# Patient Record
Sex: FEMALE | Race: BLACK OR AFRICAN AMERICAN | Employment: FULL TIME | URBAN - METROPOLITAN AREA
[De-identification: names, ages, dates, MRNs, and addresses within clinical notes are randomized per-mention and may not be internally consistent; named-entity substitution may affect disease eponyms.]

---

## 2021-06-01 PROBLEM — R06.00 DYSPNEA: Status: ACTIVE | Noted: 2018-05-08

## 2021-06-01 PROBLEM — R07.89 OTHER CHEST PAIN: Status: ACTIVE | Noted: 2018-05-08

## 2021-06-01 PROBLEM — J45.909 ASTHMA: Status: ACTIVE | Noted: 2018-05-09

## 2021-06-18 ENCOUNTER — HOSPITAL ENCOUNTER (OUTPATIENT)
Dept: ULTRASOUND IMAGING | Age: 61
Discharge: HOME | End: 2021-06-18
Attending: FAMILY MEDICINE
Payer: COMMERCIAL

## 2021-06-18 DIAGNOSIS — M79.604 PAIN IN BOTH LOWER EXTREMITIES: ICD-10-CM

## 2021-06-18 DIAGNOSIS — M79.605 PAIN IN BOTH LOWER EXTREMITIES: ICD-10-CM

## 2021-06-18 DIAGNOSIS — M79.89 LEG SWELLING: ICD-10-CM

## 2021-06-18 PROCEDURE — 93970 EXTREMITY STUDY: CPT

## 2021-10-20 LAB — MAMMOGRAPHY, EXTERNAL: NORMAL

## 2022-03-18 PROBLEM — J45.909 ASTHMA: Status: ACTIVE | Noted: 2018-05-09

## 2022-03-19 PROBLEM — R07.89 OTHER CHEST PAIN: Status: ACTIVE | Noted: 2018-05-08

## 2022-03-19 PROBLEM — R06.00 DYSPNEA: Status: ACTIVE | Noted: 2018-05-08

## 2022-07-29 ENCOUNTER — OFFICE VISIT (OUTPATIENT)
Dept: FAMILY MEDICINE CLINIC | Facility: CLINIC | Age: 62
End: 2022-07-29
Payer: COMMERCIAL

## 2022-07-29 VITALS
TEMPERATURE: 98.1 F | OXYGEN SATURATION: 99 % | BODY MASS INDEX: 33.06 KG/M2 | DIASTOLIC BLOOD PRESSURE: 80 MMHG | HEIGHT: 60 IN | WEIGHT: 168.4 LBS | SYSTOLIC BLOOD PRESSURE: 114 MMHG | HEART RATE: 70 BPM

## 2022-07-29 DIAGNOSIS — Z11.4 SCREENING FOR HIV (HUMAN IMMUNODEFICIENCY VIRUS): ICD-10-CM

## 2022-07-29 DIAGNOSIS — Z12.31 ENCOUNTER FOR SCREENING MAMMOGRAM FOR MALIGNANT NEOPLASM OF BREAST: ICD-10-CM

## 2022-07-29 DIAGNOSIS — E78.49 OTHER HYPERLIPIDEMIA: ICD-10-CM

## 2022-07-29 DIAGNOSIS — R73.09 ABNORMAL GLUCOSE: ICD-10-CM

## 2022-07-29 DIAGNOSIS — I10 PRIMARY HYPERTENSION: Primary | ICD-10-CM

## 2022-07-29 LAB
ALBUMIN SERPL-MCNC: 4.1 G/DL (ref 3.5–5)
ALBUMIN/GLOB SERPL: 1.1 {RATIO} (ref 1.2–3.5)
ALP SERPL-CCNC: 73 U/L (ref 50–136)
ALT SERPL-CCNC: 21 U/L (ref 12–65)
ANION GAP SERPL CALC-SCNC: 7 MMOL/L (ref 7–16)
AST SERPL-CCNC: 16 U/L (ref 15–37)
BASOPHILS # BLD: 0.1 K/UL (ref 0–0.2)
BASOPHILS NFR BLD: 1 % (ref 0–2)
BILIRUB SERPL-MCNC: 0.4 MG/DL (ref 0.2–1.1)
BUN SERPL-MCNC: 10 MG/DL (ref 6–23)
CALCIUM SERPL-MCNC: 9.6 MG/DL (ref 8.3–10.4)
CHLORIDE SERPL-SCNC: 105 MMOL/L (ref 98–107)
CHOLEST SERPL-MCNC: 240 MG/DL
CO2 SERPL-SCNC: 28 MMOL/L (ref 21–32)
CREAT SERPL-MCNC: 0.6 MG/DL (ref 0.6–1)
DIFFERENTIAL METHOD BLD: NORMAL
EOSINOPHIL # BLD: 0.2 K/UL (ref 0–0.8)
EOSINOPHIL NFR BLD: 2 % (ref 0.5–7.8)
ERYTHROCYTE [DISTWIDTH] IN BLOOD BY AUTOMATED COUNT: 14.5 % (ref 11.9–14.6)
EST. AVERAGE GLUCOSE BLD GHB EST-MCNC: 117 MG/DL
GLOBULIN SER CALC-MCNC: 3.6 G/DL (ref 2.3–3.5)
GLUCOSE SERPL-MCNC: 96 MG/DL (ref 65–100)
HBA1C MFR BLD: 5.7 % (ref 4.8–5.6)
HCT VFR BLD AUTO: 38.9 % (ref 35.8–46.3)
HDLC SERPL-MCNC: 43 MG/DL (ref 40–60)
HDLC SERPL: 5.6 {RATIO}
HGB BLD-MCNC: 13.1 G/DL (ref 11.7–15.4)
HIV 1+2 AB+HIV1 P24 AG SERPL QL IA: NONREACTIVE
HIV 1/2 RESULT COMMENT: NORMAL
IMM GRANULOCYTES # BLD AUTO: 0 K/UL (ref 0–0.5)
IMM GRANULOCYTES NFR BLD AUTO: 0 % (ref 0–5)
LDLC SERPL CALC-MCNC: 180.2 MG/DL
LYMPHOCYTES # BLD: 2.5 K/UL (ref 0.5–4.6)
LYMPHOCYTES NFR BLD: 35 % (ref 13–44)
MCH RBC QN AUTO: 29.5 PG (ref 26.1–32.9)
MCHC RBC AUTO-ENTMCNC: 33.7 G/DL (ref 31.4–35)
MCV RBC AUTO: 87.6 FL (ref 79.6–97.8)
MONOCYTES # BLD: 0.4 K/UL (ref 0.1–1.3)
MONOCYTES NFR BLD: 6 % (ref 4–12)
NEUTS SEG # BLD: 3.9 K/UL (ref 1.7–8.2)
NEUTS SEG NFR BLD: 56 % (ref 43–78)
NRBC # BLD: 0 K/UL (ref 0–0.2)
PLATELET # BLD AUTO: 304 K/UL (ref 150–450)
PMV BLD AUTO: 10.4 FL (ref 9.4–12.3)
POTASSIUM SERPL-SCNC: 3.7 MMOL/L (ref 3.5–5.1)
PROT SERPL-MCNC: 7.7 G/DL (ref 6.3–8.2)
RBC # BLD AUTO: 4.44 M/UL (ref 4.05–5.2)
SODIUM SERPL-SCNC: 140 MMOL/L (ref 136–145)
TRIGL SERPL-MCNC: 84 MG/DL (ref 35–150)
VLDLC SERPL CALC-MCNC: 16.8 MG/DL (ref 6–23)
WBC # BLD AUTO: 7 K/UL (ref 4.3–11.1)

## 2022-07-29 PROCEDURE — 99214 OFFICE O/P EST MOD 30 MIN: CPT | Performed by: FAMILY MEDICINE

## 2022-07-29 RX ORDER — POTASSIUM CHLORIDE 750 MG/1
10 TABLET, EXTENDED RELEASE ORAL 2 TIMES DAILY
Qty: 180 TABLET | Refills: 2 | Status: SHIPPED | OUTPATIENT
Start: 2022-07-29 | End: 2022-11-02 | Stop reason: DRUGHIGH

## 2022-07-29 RX ORDER — INDAPAMIDE 2.5 MG/1
2.5 TABLET, FILM COATED ORAL DAILY
Qty: 90 TABLET | Refills: 2 | Status: SHIPPED | OUTPATIENT
Start: 2022-07-29 | End: 2023-04-25

## 2022-07-29 ASSESSMENT — PATIENT HEALTH QUESTIONNAIRE - PHQ9
SUM OF ALL RESPONSES TO PHQ QUESTIONS 1-9: 0
1. LITTLE INTEREST OR PLEASURE IN DOING THINGS: 0
SUM OF ALL RESPONSES TO PHQ9 QUESTIONS 1 & 2: 0
SUM OF ALL RESPONSES TO PHQ QUESTIONS 1-9: 0
2. FEELING DOWN, DEPRESSED OR HOPELESS: 0

## 2022-07-30 ASSESSMENT — ENCOUNTER SYMPTOMS
BLOOD IN STOOL: 0
ABDOMINAL PAIN: 0
NAUSEA: 0
COUGH: 0
PHOTOPHOBIA: 0
ABDOMINAL DISTENTION: 0
BLURRED VISION: 0
SORE THROAT: 0
SHORTNESS OF BREATH: 0
EYE PAIN: 0
COLOR CHANGE: 0

## 2022-07-30 NOTE — PROGRESS NOTES
Werner Marcus  1960 is a 63 y.o. female ,Established patient, here for evaluation of the following chief complaint(s):   Chief Complaint   Patient presents with    3 Month Follow-Up    Medication Refill    Cholesterol Problem     Pt is fasting          1. Primary hypertension  -     potassium chloride (KLOR-CON M) 10 MEQ extended release tablet; Take 1 tablet by mouth in the morning and 1 tablet before bedtime. , Disp-180 tablet, R-2Normal  -     indapamide (LOZOL) 2.5 MG tablet; Take 1 tablet by mouth in the morning., Disp-90 tablet, R-2Normal  -     CBC with Auto Differential; Future  -     Comprehensive Metabolic Panel; Future  2. Encounter for screening mammogram for malignant neoplasm of breast  -     KWAN DIGITAL SCREENING AUGMENTED BILATERAL; Future  3. Abnormal glucose  -     Comprehensive Metabolic Panel; Future  -     Hemoglobin A1C; Future  4. Other hyperlipidemia  -     Lipid Panel; Future  5. Screening for HIV (human immunodeficiency virus)  -     HIV 1/2 Ag/Ab, 4TH Generation,W Rflx Confirm; Future        Return in about 3 months (around 10/29/2022). Subjective   SUBJECTIVE/OBJECTIVE:  Hyperlipidemia  This is a chronic problem. The current episode started more than 1 year ago. The problem is uncontrolled. Recent lipid tests were reviewed and are high. She has no history of chronic renal disease or hypothyroidism. Pertinent negatives include no chest pain, myalgias or shortness of breath. There are no compliance problems. Hypertension  This is a chronic problem. The current episode started more than 1 year ago. The problem has been gradually improving since onset. The problem is controlled. Pertinent negatives include no anxiety, blurred vision, chest pain, headaches, palpitations, peripheral edema, PND, shortness of breath or sweats. There is no history of chronic renal disease. Review of Systems   Constitutional:  Negative for chills and fever.    HENT:  Negative for ear pain, hearing loss and sore throat. Eyes:  Negative for blurred vision, photophobia and pain. Respiratory:  Negative for cough and shortness of breath. Cardiovascular:  Negative for chest pain, palpitations, leg swelling and PND. Gastrointestinal:  Negative for abdominal distention, abdominal pain, blood in stool and nausea. Genitourinary:  Negative for dysuria and urgency. Musculoskeletal:  Negative for joint swelling and myalgias. Skin:  Negative for color change, pallor and rash. Neurological:  Negative for speech difficulty, weakness, light-headedness and headaches. Hematological:  Negative for adenopathy. Psychiatric/Behavioral:  Negative for agitation, confusion, hallucinations, self-injury and suicidal ideas. Objective   Physical Exam  Constitutional:       Appearance: Normal appearance. HENT:      Head: Normocephalic and atraumatic. Nose: Nose normal.   Eyes:      Extraocular Movements: Extraocular movements intact. Conjunctiva/sclera: Conjunctivae normal.      Pupils: Pupils are equal, round, and reactive to light. Cardiovascular:      Rate and Rhythm: Normal rate and regular rhythm. Pulses: Normal pulses. Heart sounds: Normal heart sounds. Pulmonary:      Effort: Pulmonary effort is normal.      Breath sounds: Normal breath sounds. Abdominal:      General: Abdomen is flat. Bowel sounds are normal.      Palpations: Abdomen is soft. Skin:     General: Skin is warm and dry. Neurological:      General: No focal deficit present. Mental Status: She is alert and oriented to person, place, and time. Psychiatric:         Mood and Affect: Mood normal.                 An electronic signature was used to authenticate this note.     --Ibeth Mann MD

## 2022-08-02 DIAGNOSIS — E78.49 OTHER HYPERLIPIDEMIA: Primary | ICD-10-CM

## 2022-08-02 RX ORDER — PRAVASTATIN SODIUM 20 MG
20 TABLET ORAL DAILY
Qty: 30 TABLET | Refills: 5 | Status: SHIPPED | OUTPATIENT
Start: 2022-08-02 | End: 2022-11-01

## 2022-10-18 LAB — MAMMOGRAPHY, EXTERNAL: NORMAL

## 2022-11-01 ENCOUNTER — OFFICE VISIT (OUTPATIENT)
Dept: FAMILY MEDICINE CLINIC | Facility: CLINIC | Age: 62
End: 2022-11-01
Payer: COMMERCIAL

## 2022-11-01 VITALS
DIASTOLIC BLOOD PRESSURE: 78 MMHG | TEMPERATURE: 98 F | SYSTOLIC BLOOD PRESSURE: 136 MMHG | WEIGHT: 153 LBS | OXYGEN SATURATION: 98 % | HEART RATE: 82 BPM | HEIGHT: 60 IN | BODY MASS INDEX: 30.04 KG/M2

## 2022-11-01 DIAGNOSIS — Z78.9 STATIN INTOLERANCE: ICD-10-CM

## 2022-11-01 DIAGNOSIS — E78.2 MIXED HYPERLIPIDEMIA: ICD-10-CM

## 2022-11-01 DIAGNOSIS — N60.19 FIBROCYSTIC BREAST DISEASE (FCBD), UNSPECIFIED LATERALITY: ICD-10-CM

## 2022-11-01 DIAGNOSIS — R73.03 PREDIABETES: Primary | ICD-10-CM

## 2022-11-01 LAB
ALBUMIN SERPL-MCNC: 4 G/DL (ref 3.2–4.6)
ALBUMIN/GLOB SERPL: 1.2 {RATIO} (ref 0.4–1.6)
ALP SERPL-CCNC: 80 U/L (ref 50–136)
ALT SERPL-CCNC: 26 U/L (ref 12–65)
ANION GAP SERPL CALC-SCNC: 3 MMOL/L (ref 2–11)
AST SERPL-CCNC: 16 U/L (ref 15–37)
BILIRUB SERPL-MCNC: 0.4 MG/DL (ref 0.2–1.1)
BUN SERPL-MCNC: 17 MG/DL (ref 8–23)
CALCIUM SERPL-MCNC: 10.3 MG/DL (ref 8.3–10.4)
CHLORIDE SERPL-SCNC: 104 MMOL/L (ref 101–110)
CHOLEST SERPL-MCNC: 170 MG/DL
CO2 SERPL-SCNC: 32 MMOL/L (ref 21–32)
CREAT SERPL-MCNC: 0.6 MG/DL (ref 0.6–1)
GLOBULIN SER CALC-MCNC: 3.4 G/DL (ref 2.8–4.5)
GLUCOSE SERPL-MCNC: 102 MG/DL (ref 65–100)
HDLC SERPL-MCNC: 32 MG/DL (ref 40–60)
HDLC SERPL: 5.3 {RATIO}
LDLC SERPL CALC-MCNC: 117.4 MG/DL
POTASSIUM SERPL-SCNC: 3.2 MMOL/L (ref 3.5–5.1)
PROT SERPL-MCNC: 7.4 G/DL (ref 6.3–8.2)
SODIUM SERPL-SCNC: 139 MMOL/L (ref 133–143)
TRIGL SERPL-MCNC: 103 MG/DL (ref 35–150)
VLDLC SERPL CALC-MCNC: 20.6 MG/DL (ref 6–23)

## 2022-11-01 PROCEDURE — 99213 OFFICE O/P EST LOW 20 MIN: CPT | Performed by: FAMILY MEDICINE

## 2022-11-01 ASSESSMENT — PATIENT HEALTH QUESTIONNAIRE - PHQ9
1. LITTLE INTEREST OR PLEASURE IN DOING THINGS: 0
SUM OF ALL RESPONSES TO PHQ9 QUESTIONS 1 & 2: 0
2. FEELING DOWN, DEPRESSED OR HOPELESS: 0
SUM OF ALL RESPONSES TO PHQ QUESTIONS 1-9: 0

## 2022-11-02 DIAGNOSIS — E87.6 HYPOKALEMIA: Primary | ICD-10-CM

## 2022-11-02 LAB
EST. AVERAGE GLUCOSE BLD GHB EST-MCNC: 128 MG/DL
HBA1C MFR BLD: 6.1 % (ref 4.8–5.6)

## 2022-11-02 RX ORDER — POTASSIUM CHLORIDE 750 MG/1
10 TABLET, EXTENDED RELEASE ORAL 3 TIMES DAILY
Qty: 90 TABLET | Refills: 1 | Status: SHIPPED | OUTPATIENT
Start: 2022-11-02

## 2022-11-02 ASSESSMENT — ENCOUNTER SYMPTOMS
SHORTNESS OF BREATH: 0
PHOTOPHOBIA: 0
COUGH: 0
ABDOMINAL DISTENTION: 0
NAUSEA: 0
BLOOD IN STOOL: 0
EYE PAIN: 0
ABDOMINAL PAIN: 0
SORE THROAT: 0
COLOR CHANGE: 0

## 2022-11-02 NOTE — PROGRESS NOTES
Juan Chisholm  1960 is a 63 y.o. female ,Established patient, here for evaluation of the following chief complaint(s):   Chief Complaint   Patient presents with    Follow-up     STOPPED STATIN DUE TO SIDE EFFECTS           1. Prediabetes  -     Comprehensive Metabolic Panel; Future  -     Hemoglobin A1C; Future  2. Mixed hyperlipidemia  -     Lipid Panel; Future  3. Statin intolerance--CANNOT TAKE STATINS ---WILL CHECK CHOL. 4. Fibrocystic breast disease (FCBD), unspecified laterality--SHE WILL HAVE FOLLOW UP IMAGING IN April 2023--per radkiology recommendation. Return in about 3 months (around 2/1/2023). Subjective   SUBJECTIVE/OBJECTIVE:  Hyperlipidemia  This is a chronic (SHE COULD NOT TOLERATE CHOL MED) problem. The current episode started more than 1 year ago. The problem is resistant. Recent lipid tests were reviewed and are high. She has no history of chronic renal disease. PREDIABETES. Pertinent negatives include no chest pain, myalgias or shortness of breath. Review of Systems   Constitutional:  Negative for chills and fever. HENT:  Negative for ear pain, hearing loss and sore throat. Eyes:  Negative for photophobia and pain. Respiratory:  Negative for cough and shortness of breath. Cardiovascular:  Negative for chest pain, palpitations and leg swelling. Gastrointestinal:  Negative for abdominal distention, abdominal pain, blood in stool and nausea. Genitourinary:  Negative for dysuria and urgency. Musculoskeletal:  Negative for joint swelling and myalgias. Skin:  Negative for color change, pallor and rash. Neurological:  Negative for speech difficulty, weakness, light-headedness and headaches. Hematological:  Negative for adenopathy. Psychiatric/Behavioral:  Negative for agitation, confusion, hallucinations, self-injury and suicidal ideas. Objective   Physical Exam  Constitutional:       Appearance: Normal appearance.    HENT:      Head: Normocephalic and atraumatic. Nose: Nose normal.   Eyes:      Extraocular Movements: Extraocular movements intact. Conjunctiva/sclera: Conjunctivae normal.      Pupils: Pupils are equal, round, and reactive to light. Cardiovascular:      Rate and Rhythm: Normal rate and regular rhythm. Pulses: Normal pulses. Heart sounds: Normal heart sounds. Pulmonary:      Effort: Pulmonary effort is normal.      Breath sounds: Normal breath sounds. Abdominal:      General: Abdomen is flat. Bowel sounds are normal.      Palpations: Abdomen is soft. Skin:     General: Skin is warm and dry. Neurological:      General: No focal deficit present. Mental Status: She is alert and oriented to person, place, and time. Psychiatric:         Mood and Affect: Mood normal.                 An electronic signature was used to authenticate this note.     --Bladimir Valderrama MD

## 2022-11-25 ENCOUNTER — TELEPHONE (OUTPATIENT)
Dept: FAMILY MEDICINE CLINIC | Facility: CLINIC | Age: 62
End: 2022-11-25

## 2022-11-25 DIAGNOSIS — E87.6 HYPOKALEMIA: Primary | ICD-10-CM

## 2022-11-30 ENCOUNTER — NURSE ONLY (OUTPATIENT)
Dept: FAMILY MEDICINE CLINIC | Facility: CLINIC | Age: 62
End: 2022-11-30

## 2022-11-30 DIAGNOSIS — E87.6 HYPOKALEMIA: ICD-10-CM

## 2022-11-30 LAB — POTASSIUM SERPL-SCNC: 4.1 MMOL/L (ref 3.5–5.1)

## 2023-03-28 DIAGNOSIS — E87.6 HYPOKALEMIA: ICD-10-CM

## 2023-03-28 RX ORDER — POTASSIUM CHLORIDE 750 MG/1
10 TABLET, EXTENDED RELEASE ORAL 3 TIMES DAILY
Qty: 90 TABLET | Refills: 1 | Status: SHIPPED | OUTPATIENT
Start: 2023-03-28

## 2023-04-18 ENCOUNTER — OFFICE VISIT (OUTPATIENT)
Dept: FAMILY MEDICINE CLINIC | Facility: CLINIC | Age: 63
End: 2023-04-18
Payer: COMMERCIAL

## 2023-04-18 VITALS
BODY MASS INDEX: 33.57 KG/M2 | HEART RATE: 83 BPM | TEMPERATURE: 98.3 F | HEIGHT: 60 IN | WEIGHT: 171 LBS | OXYGEN SATURATION: 98 % | DIASTOLIC BLOOD PRESSURE: 86 MMHG | SYSTOLIC BLOOD PRESSURE: 118 MMHG

## 2023-04-18 DIAGNOSIS — R73.03 PREDIABETES: ICD-10-CM

## 2023-04-18 DIAGNOSIS — E87.6 HYPOKALEMIA: ICD-10-CM

## 2023-04-18 DIAGNOSIS — J45.21 MILD INTERMITTENT REACTIVE AIRWAY DISEASE WITH ACUTE EXACERBATION: Primary | ICD-10-CM

## 2023-04-18 DIAGNOSIS — I10 PRIMARY HYPERTENSION: ICD-10-CM

## 2023-04-18 DIAGNOSIS — E78.2 MIXED HYPERLIPIDEMIA: ICD-10-CM

## 2023-04-18 LAB
BASOPHILS # BLD: 0.1 K/UL (ref 0–0.2)
BASOPHILS NFR BLD: 1 % (ref 0–2)
DIFFERENTIAL METHOD BLD: NORMAL
EOSINOPHIL # BLD: 0.4 K/UL (ref 0–0.8)
EOSINOPHIL NFR BLD: 4 % (ref 0.5–7.8)
ERYTHROCYTE [DISTWIDTH] IN BLOOD BY AUTOMATED COUNT: 14.6 % (ref 11.9–14.6)
HCT VFR BLD AUTO: 39 % (ref 35.8–46.3)
HGB BLD-MCNC: 12.9 G/DL (ref 11.7–15.4)
IMM GRANULOCYTES # BLD AUTO: 0 K/UL (ref 0–0.5)
IMM GRANULOCYTES NFR BLD AUTO: 0 % (ref 0–5)
LYMPHOCYTES # BLD: 3.8 K/UL (ref 0.5–4.6)
LYMPHOCYTES NFR BLD: 40 % (ref 13–44)
MCH RBC QN AUTO: 29.6 PG (ref 26.1–32.9)
MCHC RBC AUTO-ENTMCNC: 33.1 G/DL (ref 31.4–35)
MCV RBC AUTO: 89.4 FL (ref 82–102)
MONOCYTES # BLD: 0.6 K/UL (ref 0.1–1.3)
MONOCYTES NFR BLD: 6 % (ref 4–12)
NEUTS SEG # BLD: 4.6 K/UL (ref 1.7–8.2)
NEUTS SEG NFR BLD: 49 % (ref 43–78)
NRBC # BLD: 0 K/UL (ref 0–0.2)
PLATELET # BLD AUTO: 321 K/UL (ref 150–450)
PMV BLD AUTO: 10.6 FL (ref 9.4–12.3)
RBC # BLD AUTO: 4.36 M/UL (ref 4.05–5.2)
WBC # BLD AUTO: 9.4 K/UL (ref 4.3–11.1)

## 2023-04-18 PROCEDURE — 99214 OFFICE O/P EST MOD 30 MIN: CPT | Performed by: FAMILY MEDICINE

## 2023-04-18 PROCEDURE — 3074F SYST BP LT 130 MM HG: CPT | Performed by: FAMILY MEDICINE

## 2023-04-18 PROCEDURE — 3079F DIAST BP 80-89 MM HG: CPT | Performed by: FAMILY MEDICINE

## 2023-04-18 RX ORDER — POTASSIUM CHLORIDE 750 MG/1
10 TABLET, EXTENDED RELEASE ORAL 3 TIMES DAILY
Qty: 90 TABLET | Refills: 2 | Status: SHIPPED | OUTPATIENT
Start: 2023-04-18

## 2023-04-18 RX ORDER — INDAPAMIDE 2.5 MG/1
2.5 TABLET, FILM COATED ORAL DAILY
Qty: 90 TABLET | Refills: 2 | Status: SHIPPED | OUTPATIENT
Start: 2023-04-18 | End: 2024-01-13

## 2023-04-18 RX ORDER — ALBUTEROL SULFATE 90 UG/1
2 AEROSOL, METERED RESPIRATORY (INHALATION) EVERY 6 HOURS PRN
Qty: 18 G | Refills: 5 | Status: SHIPPED | OUTPATIENT
Start: 2023-04-18

## 2023-04-18 SDOH — ECONOMIC STABILITY: FOOD INSECURITY: WITHIN THE PAST 12 MONTHS, THE FOOD YOU BOUGHT JUST DIDN'T LAST AND YOU DIDN'T HAVE MONEY TO GET MORE.: SOMETIMES TRUE

## 2023-04-18 SDOH — ECONOMIC STABILITY: HOUSING INSECURITY
IN THE LAST 12 MONTHS, WAS THERE A TIME WHEN YOU DID NOT HAVE A STEADY PLACE TO SLEEP OR SLEPT IN A SHELTER (INCLUDING NOW)?: NO

## 2023-04-18 SDOH — ECONOMIC STABILITY: INCOME INSECURITY: HOW HARD IS IT FOR YOU TO PAY FOR THE VERY BASICS LIKE FOOD, HOUSING, MEDICAL CARE, AND HEATING?: PATIENT DECLINED

## 2023-04-18 SDOH — ECONOMIC STABILITY: FOOD INSECURITY: WITHIN THE PAST 12 MONTHS, YOU WORRIED THAT YOUR FOOD WOULD RUN OUT BEFORE YOU GOT MONEY TO BUY MORE.: SOMETIMES TRUE

## 2023-04-18 ASSESSMENT — ENCOUNTER SYMPTOMS
PHOTOPHOBIA: 0
ABDOMINAL DISTENTION: 0
SHORTNESS OF BREATH: 0
COLOR CHANGE: 0
BLURRED VISION: 0
EYE PAIN: 0
NAUSEA: 0
CHANGE IN BOWEL HABIT: 0
COUGH: 0
ABDOMINAL PAIN: 0
BLOOD IN STOOL: 0
SORE THROAT: 0

## 2023-04-18 ASSESSMENT — PATIENT HEALTH QUESTIONNAIRE - PHQ9
SUM OF ALL RESPONSES TO PHQ QUESTIONS 1-9: 0
1. LITTLE INTEREST OR PLEASURE IN DOING THINGS: 0
SUM OF ALL RESPONSES TO PHQ QUESTIONS 1-9: 0
SUM OF ALL RESPONSES TO PHQ9 QUESTIONS 1 & 2: 0
2. FEELING DOWN, DEPRESSED OR HOPELESS: 0

## 2023-04-18 NOTE — PATIENT INSTRUCTIONS
card and are ALWAYS $5 with SNAP/EBT   Boxes are distributed every other week and you must preorder your box through their website  Drive-thru box pickup is every other Wednesday from 11 am-6 pm at: [address] (Nicholas Gann) [facility], [address] [facility]  Website:  [email or web address] Medical [email or web address] - Columbus SouthiFulfillment. [employer]/fsg     Food Pantries:   [address]   Phone: [phone]  Located in [facility], [address].  Open Thursdays 8a-12p  Website: wwwWalkHub [employer]: [phone]  Located at [address] [place] [address]., 8am-12pm Fridays  Website: [email or web address]. [email or web address]   [address] Pantry  Phone: [phone]  Located at [address].  Call for Pantry hours and availability  Website: PlayFitness.ee  Water of [address] [place] [address] Pantry  Phone: [phone]  Located at [phone]  Call for Pantry hours and availability    Website: Volt.pl. php  [address]  Phone: [phone]  Located at [facility]. Emergency Food Pantry Hours: Mon, Wed, and Fri 9am-1pm  Website: [email or web address].[employer]/  [address] Pantry  Phone: [phone]  Located at [address] [facility]. Call for hours and availability   Website: https://viVood/  [address] Pantry  Phone: [phone]  Located at [address]. Call for hours and availability; serves up to 76 families a week, based on donations  Website: [email or web address]      Need additional resources? Call 211 or Find Help: https://www. findhelp.org/

## 2023-04-18 NOTE — PROGRESS NOTES
Yennifer Rivera  1960 is a 63 y.o. female ,Established patient, here for evaluation of the following chief complaint(s):   Chief Complaint   Patient presents with    Follow-up     Pt is not fasting-    Medication Refill          1. Mild intermittent reactive airway disease with acute exacerbation  -     albuterol sulfate HFA (PROVENTIL;VENTOLIN;PROAIR) 108 (90 Base) MCG/ACT inhaler; Inhale 2 puffs into the lungs every 6 hours as needed for Wheezing, Disp-18 g, R-5Normal  2. Primary hypertension  -     indapamide (LOZOL) 2.5 MG tablet; Take 1 tablet by mouth daily, Disp-90 tablet, R-2Normal  -     CBC with Auto Differential; Future  -     Comprehensive Metabolic Panel; Future  3. Hypokalemia  -     potassium chloride (KLOR-CON M) 10 MEQ extended release tablet; Take 1 tablet by mouth 3 times daily, Disp-90 tablet, R-2Normal  4. Mixed hyperlipidemia  -     Lipid Panel; Future  5. Prediabetes  -     Hemoglobin A1C; Future        Return in about 6 months (around 10/18/2023). Subjective   SUBJECTIVE/OBJECTIVE:  Medication Refill  This is a chronic problem. The current episode started more than 1 year ago. The problem occurs daily. The problem has been unchanged. Pertinent negatives include no abdominal pain, anorexia, arthralgias, change in bowel habit, chest pain, chills, coughing, fever, headaches, joint swelling, myalgias, nausea, rash, sore throat or weakness. Hypertension  This is a chronic problem. The current episode started more than 1 year ago. The problem has been gradually improving since onset. Pertinent negatives include no anxiety, blurred vision, chest pain, headaches, malaise/fatigue, palpitations, peripheral edema, PND, shortness of breath or sweats. Review of Systems   Constitutional:  Negative for chills, fever and malaise/fatigue. HENT:  Negative for ear pain, hearing loss and sore throat. Eyes:  Negative for blurred vision, photophobia and pain.    Respiratory:  Negative for

## 2023-04-19 LAB
ALBUMIN SERPL-MCNC: 3.9 G/DL (ref 3.2–4.6)
ALBUMIN/GLOB SERPL: 1 (ref 0.4–1.6)
ALP SERPL-CCNC: 75 U/L (ref 50–136)
ALT SERPL-CCNC: 22 U/L (ref 12–65)
ANION GAP SERPL CALC-SCNC: 5 MMOL/L (ref 2–11)
AST SERPL-CCNC: 18 U/L (ref 15–37)
BILIRUB SERPL-MCNC: 0.2 MG/DL (ref 0.2–1.1)
BUN SERPL-MCNC: 15 MG/DL (ref 8–23)
CALCIUM SERPL-MCNC: 9.6 MG/DL (ref 8.3–10.4)
CHLORIDE SERPL-SCNC: 102 MMOL/L (ref 101–110)
CHOLEST SERPL-MCNC: 194 MG/DL
CO2 SERPL-SCNC: 30 MMOL/L (ref 21–32)
CREAT SERPL-MCNC: 0.6 MG/DL (ref 0.6–1)
EST. AVERAGE GLUCOSE BLD GHB EST-MCNC: 120 MG/DL
GLOBULIN SER CALC-MCNC: 3.9 G/DL (ref 2.8–4.5)
GLUCOSE SERPL-MCNC: 97 MG/DL (ref 65–100)
HBA1C MFR BLD: 5.8 % (ref 4.8–5.6)
HDLC SERPL-MCNC: 40 MG/DL (ref 40–60)
HDLC SERPL: 4.9
LDLC SERPL CALC-MCNC: 128.6 MG/DL
POTASSIUM SERPL-SCNC: 3.4 MMOL/L (ref 3.5–5.1)
PROT SERPL-MCNC: 7.8 G/DL (ref 6.3–8.2)
SODIUM SERPL-SCNC: 137 MMOL/L (ref 133–143)
TRIGL SERPL-MCNC: 127 MG/DL (ref 35–150)
VLDLC SERPL CALC-MCNC: 25.4 MG/DL (ref 6–23)

## 2023-09-20 ENCOUNTER — TELEPHONE (OUTPATIENT)
Dept: FAMILY MEDICINE CLINIC | Facility: CLINIC | Age: 63
End: 2023-09-20

## 2023-09-20 DIAGNOSIS — E87.6 HYPOKALEMIA: ICD-10-CM

## 2023-09-20 RX ORDER — POTASSIUM CHLORIDE 750 MG/1
10 TABLET, EXTENDED RELEASE ORAL 3 TIMES DAILY
Qty: 90 TABLET | Refills: 2 | Status: SHIPPED | OUTPATIENT
Start: 2023-09-20

## 2023-12-05 ENCOUNTER — OFFICE VISIT (OUTPATIENT)
Dept: FAMILY MEDICINE CLINIC | Facility: CLINIC | Age: 63
End: 2023-12-05
Payer: COMMERCIAL

## 2023-12-05 VITALS
WEIGHT: 170.4 LBS | OXYGEN SATURATION: 99 % | BODY MASS INDEX: 33.28 KG/M2 | SYSTOLIC BLOOD PRESSURE: 120 MMHG | HEART RATE: 76 BPM | DIASTOLIC BLOOD PRESSURE: 78 MMHG | TEMPERATURE: 98.3 F

## 2023-12-05 DIAGNOSIS — J45.21 MILD INTERMITTENT REACTIVE AIRWAY DISEASE WITH ACUTE EXACERBATION: ICD-10-CM

## 2023-12-05 DIAGNOSIS — R73.03 PREDIABETES: Primary | ICD-10-CM

## 2023-12-05 DIAGNOSIS — E78.2 MIXED HYPERLIPIDEMIA: ICD-10-CM

## 2023-12-05 DIAGNOSIS — E87.6 HYPOKALEMIA: ICD-10-CM

## 2023-12-05 DIAGNOSIS — I10 PRIMARY HYPERTENSION: ICD-10-CM

## 2023-12-05 LAB
ALBUMIN SERPL-MCNC: 3.9 G/DL (ref 3.2–4.6)
ALBUMIN/GLOB SERPL: 1 (ref 0.4–1.6)
ALP SERPL-CCNC: 68 U/L (ref 50–136)
ALT SERPL-CCNC: 24 U/L (ref 12–65)
ANION GAP SERPL CALC-SCNC: 8 MMOL/L (ref 2–11)
AST SERPL-CCNC: 16 U/L (ref 15–37)
BASOPHILS # BLD: 0.1 K/UL (ref 0–0.2)
BASOPHILS NFR BLD: 1 % (ref 0–2)
BILIRUB SERPL-MCNC: 0.4 MG/DL (ref 0.2–1.1)
BUN SERPL-MCNC: 14 MG/DL (ref 8–23)
CALCIUM SERPL-MCNC: 9.6 MG/DL (ref 8.3–10.4)
CHLORIDE SERPL-SCNC: 102 MMOL/L (ref 103–113)
CHOLEST SERPL-MCNC: 228 MG/DL
CO2 SERPL-SCNC: 27 MMOL/L (ref 21–32)
CREAT SERPL-MCNC: 0.6 MG/DL (ref 0.6–1)
DIFFERENTIAL METHOD BLD: NORMAL
EOSINOPHIL # BLD: 0.3 K/UL (ref 0–0.8)
EOSINOPHIL NFR BLD: 4 % (ref 0.5–7.8)
ERYTHROCYTE [DISTWIDTH] IN BLOOD BY AUTOMATED COUNT: 14.6 % (ref 11.9–14.6)
EST. AVERAGE GLUCOSE BLD GHB EST-MCNC: 117 MG/DL
GLOBULIN SER CALC-MCNC: 4 G/DL (ref 2.8–4.5)
GLUCOSE SERPL-MCNC: 101 MG/DL (ref 65–100)
HBA1C MFR BLD: 5.7 % (ref 4.8–5.6)
HCT VFR BLD AUTO: 37.2 % (ref 35.8–46.3)
HDLC SERPL-MCNC: 41 MG/DL (ref 40–60)
HDLC SERPL: 5.6
HGB BLD-MCNC: 12.5 G/DL (ref 11.7–15.4)
IMM GRANULOCYTES # BLD AUTO: 0 K/UL (ref 0–0.5)
IMM GRANULOCYTES NFR BLD AUTO: 0 % (ref 0–5)
LDLC SERPL CALC-MCNC: 165 MG/DL
LYMPHOCYTES # BLD: 3.3 K/UL (ref 0.5–4.6)
LYMPHOCYTES NFR BLD: 38 % (ref 13–44)
MCH RBC QN AUTO: 29.4 PG (ref 26.1–32.9)
MCHC RBC AUTO-ENTMCNC: 33.6 G/DL (ref 31.4–35)
MCV RBC AUTO: 87.5 FL (ref 82–102)
MONOCYTES # BLD: 0.5 K/UL (ref 0.1–1.3)
MONOCYTES NFR BLD: 5 % (ref 4–12)
NEUTS SEG # BLD: 4.4 K/UL (ref 1.7–8.2)
NEUTS SEG NFR BLD: 52 % (ref 43–78)
NRBC # BLD: 0 K/UL (ref 0–0.2)
PLATELET # BLD AUTO: 307 K/UL (ref 150–450)
PMV BLD AUTO: 10.1 FL (ref 9.4–12.3)
POTASSIUM SERPL-SCNC: 3.1 MMOL/L (ref 3.5–5.1)
PROT SERPL-MCNC: 7.9 G/DL (ref 6.3–8.2)
RBC # BLD AUTO: 4.25 M/UL (ref 4.05–5.2)
SODIUM SERPL-SCNC: 137 MMOL/L (ref 136–146)
TRIGL SERPL-MCNC: 110 MG/DL (ref 35–150)
VLDLC SERPL CALC-MCNC: 22 MG/DL (ref 6–23)
WBC # BLD AUTO: 8.5 K/UL (ref 4.3–11.1)

## 2023-12-05 PROCEDURE — 3078F DIAST BP <80 MM HG: CPT | Performed by: FAMILY MEDICINE

## 2023-12-05 PROCEDURE — 99214 OFFICE O/P EST MOD 30 MIN: CPT | Performed by: FAMILY MEDICINE

## 2023-12-05 PROCEDURE — 3074F SYST BP LT 130 MM HG: CPT | Performed by: FAMILY MEDICINE

## 2023-12-05 RX ORDER — INDAPAMIDE 2.5 MG/1
2.5 TABLET ORAL DAILY
Qty: 90 TABLET | Refills: 2 | Status: SHIPPED | OUTPATIENT
Start: 2023-12-05 | End: 2024-08-31

## 2023-12-05 RX ORDER — POTASSIUM CHLORIDE 750 MG/1
10 TABLET, EXTENDED RELEASE ORAL 3 TIMES DAILY
Qty: 90 TABLET | Refills: 2 | Status: SHIPPED | OUTPATIENT
Start: 2023-12-05 | End: 2023-12-06 | Stop reason: DRUGHIGH

## 2023-12-05 RX ORDER — ALBUTEROL SULFATE 90 UG/1
2 AEROSOL, METERED RESPIRATORY (INHALATION) EVERY 6 HOURS PRN
Qty: 18 G | Refills: 5 | Status: SHIPPED | OUTPATIENT
Start: 2023-12-05

## 2023-12-06 DIAGNOSIS — E87.6 HYPOKALEMIA: Primary | ICD-10-CM

## 2023-12-06 RX ORDER — POTASSIUM CHLORIDE 20 MEQ/1
20 TABLET, EXTENDED RELEASE ORAL 2 TIMES DAILY
Qty: 180 TABLET | Refills: 1 | Status: SHIPPED | OUTPATIENT
Start: 2023-12-06

## 2023-12-07 ASSESSMENT — ENCOUNTER SYMPTOMS
DIFFICULTY BREATHING: 0
SHORTNESS OF BREATH: 0
HEMOPTYSIS: 0
ABDOMINAL PAIN: 0
BLURRED VISION: 0
NAUSEA: 0
COLOR CHANGE: 0
CHEST TIGHTNESS: 0
COUGH: 0
FREQUENT THROAT CLEARING: 0
ABDOMINAL DISTENTION: 0
EYE PAIN: 0
SORE THROAT: 0
PHOTOPHOBIA: 0
BLOOD IN STOOL: 0

## 2023-12-08 NOTE — PROGRESS NOTES
shortness of breath or sweats. Review of Systems   Constitutional:  Negative for chills and fever. HENT:  Negative for ear pain, hearing loss and sore throat. Eyes:  Negative for blurred vision, photophobia and pain. Respiratory:  Negative for cough, hemoptysis and shortness of breath. Cardiovascular:  Negative for chest pain, palpitations, leg swelling and PND. Gastrointestinal:  Negative for abdominal distention, abdominal pain, anorexia, blood in stool and nausea. Genitourinary:  Negative for dysuria and urgency. Musculoskeletal:  Negative for joint swelling and myalgias. Skin:  Negative for color change, pallor and rash. Neurological:  Negative for speech difficulty, weakness, light-headedness and headaches. Hematological:  Negative for adenopathy. Psychiatric/Behavioral:  Negative for agitation, confusion, hallucinations, self-injury and suicidal ideas. Objective   Physical Exam  Constitutional:       Appearance: Normal appearance. HENT:      Head: Normocephalic and atraumatic. Nose: Nose normal.   Eyes:      Extraocular Movements: Extraocular movements intact. Conjunctiva/sclera: Conjunctivae normal.      Pupils: Pupils are equal, round, and reactive to light. Cardiovascular:      Rate and Rhythm: Normal rate and regular rhythm. Pulses: Normal pulses. Heart sounds: Normal heart sounds. Pulmonary:      Effort: Pulmonary effort is normal.      Breath sounds: Normal breath sounds. Abdominal:      General: Abdomen is flat. Bowel sounds are normal.      Palpations: Abdomen is soft. Skin:     General: Skin is warm and dry. Neurological:      General: No focal deficit present. Mental Status: She is alert and oriented to person, place, and time. Psychiatric:         Mood and Affect: Mood normal.                   An electronic signature was used to authenticate this note.     --Jessie Marie MD

## 2024-02-06 ENCOUNTER — TELEPHONE (OUTPATIENT)
Dept: FAMILY MEDICINE CLINIC | Facility: CLINIC | Age: 64
End: 2024-02-06

## 2024-02-06 DIAGNOSIS — E87.6 HYPOKALEMIA: ICD-10-CM

## 2024-02-07 RX ORDER — POTASSIUM CHLORIDE 20 MEQ/1
20 TABLET, EXTENDED RELEASE ORAL 2 TIMES DAILY
Qty: 180 TABLET | Refills: 1 | Status: SHIPPED | OUTPATIENT
Start: 2024-02-07 | End: 2024-02-09 | Stop reason: ALTCHOICE

## 2024-02-09 ENCOUNTER — TELEPHONE (OUTPATIENT)
Dept: FAMILY MEDICINE CLINIC | Facility: CLINIC | Age: 64
End: 2024-02-09

## 2024-02-09 DIAGNOSIS — E87.6 HYPOKALEMIA: Primary | ICD-10-CM

## 2024-02-09 RX ORDER — POTASSIUM CHLORIDE 750 MG/1
10 TABLET, FILM COATED, EXTENDED RELEASE ORAL DAILY
Qty: 60 TABLET | Refills: 3 | Status: SHIPPED | OUTPATIENT
Start: 2024-02-09 | End: 2024-02-09 | Stop reason: CLARIF

## 2024-02-09 RX ORDER — POTASSIUM CHLORIDE 750 MG/1
10 TABLET, FILM COATED, EXTENDED RELEASE ORAL 3 TIMES DAILY
Qty: 90 TABLET | Refills: 3 | Status: SHIPPED | OUTPATIENT
Start: 2024-02-09

## 2024-02-09 NOTE — TELEPHONE ENCOUNTER
Pt would like to take the Potassium Chloride 10 MEQ instead of 20MEQ.    She takes the 10MEQ 1 tablet by mouth 3 times daily.

## 2024-03-28 ENCOUNTER — OFFICE VISIT (OUTPATIENT)
Dept: FAMILY MEDICINE CLINIC | Facility: CLINIC | Age: 64
End: 2024-03-28
Payer: COMMERCIAL

## 2024-03-28 VITALS
OXYGEN SATURATION: 97 % | TEMPERATURE: 98.7 F | BODY MASS INDEX: 32.79 KG/M2 | WEIGHT: 167 LBS | DIASTOLIC BLOOD PRESSURE: 74 MMHG | HEART RATE: 84 BPM | SYSTOLIC BLOOD PRESSURE: 120 MMHG | HEIGHT: 60 IN

## 2024-03-28 DIAGNOSIS — E87.6 HYPOKALEMIA: ICD-10-CM

## 2024-03-28 DIAGNOSIS — E78.49 OTHER HYPERLIPIDEMIA: ICD-10-CM

## 2024-03-28 DIAGNOSIS — Z12.11 SCREEN FOR COLON CANCER: Primary | ICD-10-CM

## 2024-03-28 DIAGNOSIS — R73.09 ABNORMAL GLUCOSE: ICD-10-CM

## 2024-03-28 DIAGNOSIS — J45.21 MILD INTERMITTENT REACTIVE AIRWAY DISEASE WITH ACUTE EXACERBATION: ICD-10-CM

## 2024-03-28 DIAGNOSIS — I10 PRIMARY HYPERTENSION: ICD-10-CM

## 2024-03-28 LAB
ALBUMIN SERPL-MCNC: 3.8 G/DL (ref 3.2–4.6)
ALBUMIN/GLOB SERPL: 1 (ref 0.4–1.6)
ALP SERPL-CCNC: 76 U/L (ref 50–136)
ALT SERPL-CCNC: 23 U/L (ref 12–65)
ANION GAP SERPL CALC-SCNC: 4 MMOL/L (ref 2–11)
AST SERPL-CCNC: 13 U/L (ref 15–37)
BASOPHILS # BLD: 0.1 K/UL (ref 0–0.2)
BASOPHILS NFR BLD: 1 % (ref 0–2)
BILIRUB SERPL-MCNC: 0.5 MG/DL (ref 0.2–1.1)
BUN SERPL-MCNC: 15 MG/DL (ref 8–23)
CALCIUM SERPL-MCNC: 10 MG/DL (ref 8.3–10.4)
CHLORIDE SERPL-SCNC: 103 MMOL/L (ref 103–113)
CHOLEST SERPL-MCNC: 221 MG/DL
CO2 SERPL-SCNC: 32 MMOL/L (ref 21–32)
CREAT SERPL-MCNC: 0.6 MG/DL (ref 0.6–1)
DIFFERENTIAL METHOD BLD: ABNORMAL
EOSINOPHIL # BLD: 0.3 K/UL (ref 0–0.8)
EOSINOPHIL NFR BLD: 4 % (ref 0.5–7.8)
ERYTHROCYTE [DISTWIDTH] IN BLOOD BY AUTOMATED COUNT: 15 % (ref 11.9–14.6)
GLOBULIN SER CALC-MCNC: 3.7 G/DL (ref 2.8–4.5)
GLUCOSE SERPL-MCNC: 102 MG/DL (ref 65–100)
HCT VFR BLD AUTO: 37.6 % (ref 35.8–46.3)
HDLC SERPL-MCNC: 48 MG/DL (ref 40–60)
HDLC SERPL: 4.6
HGB BLD-MCNC: 12.5 G/DL (ref 11.7–15.4)
IMM GRANULOCYTES # BLD AUTO: 0 K/UL (ref 0–0.5)
IMM GRANULOCYTES NFR BLD AUTO: 0 % (ref 0–5)
LDLC SERPL CALC-MCNC: 159.4 MG/DL
LYMPHOCYTES # BLD: 3 K/UL (ref 0.5–4.6)
LYMPHOCYTES NFR BLD: 33 % (ref 13–44)
MCH RBC QN AUTO: 29 PG (ref 26.1–32.9)
MCHC RBC AUTO-ENTMCNC: 33.2 G/DL (ref 31.4–35)
MCV RBC AUTO: 87.2 FL (ref 82–102)
MONOCYTES # BLD: 0.6 K/UL (ref 0.1–1.3)
MONOCYTES NFR BLD: 6 % (ref 4–12)
NEUTS SEG # BLD: 5.3 K/UL (ref 1.7–8.2)
NEUTS SEG NFR BLD: 56 % (ref 43–78)
NRBC # BLD: 0 K/UL (ref 0–0.2)
PLATELET # BLD AUTO: 316 K/UL (ref 150–450)
PMV BLD AUTO: 10.2 FL (ref 9.4–12.3)
POTASSIUM SERPL-SCNC: 3.5 MMOL/L (ref 3.5–5.1)
PROT SERPL-MCNC: 7.5 G/DL (ref 6.3–8.2)
RBC # BLD AUTO: 4.31 M/UL (ref 4.05–5.2)
SODIUM SERPL-SCNC: 139 MMOL/L (ref 136–146)
TRIGL SERPL-MCNC: 68 MG/DL (ref 35–150)
VLDLC SERPL CALC-MCNC: 13.6 MG/DL (ref 6–23)
WBC # BLD AUTO: 9.3 K/UL (ref 4.3–11.1)

## 2024-03-28 PROCEDURE — 99214 OFFICE O/P EST MOD 30 MIN: CPT | Performed by: FAMILY MEDICINE

## 2024-03-28 PROCEDURE — 3074F SYST BP LT 130 MM HG: CPT | Performed by: FAMILY MEDICINE

## 2024-03-28 PROCEDURE — 3078F DIAST BP <80 MM HG: CPT | Performed by: FAMILY MEDICINE

## 2024-03-28 RX ORDER — INDAPAMIDE 2.5 MG/1
2.5 TABLET ORAL DAILY
Qty: 90 TABLET | Refills: 2 | Status: SHIPPED | OUTPATIENT
Start: 2024-03-28 | End: 2024-12-23

## 2024-03-28 RX ORDER — POTASSIUM CHLORIDE 20 MEQ/1
20 TABLET, EXTENDED RELEASE ORAL DAILY
Qty: 60 TABLET | Refills: 5 | Status: SHIPPED | OUTPATIENT
Start: 2024-03-28

## 2024-03-28 RX ORDER — ALBUTEROL SULFATE 90 UG/1
2 AEROSOL, METERED RESPIRATORY (INHALATION) EVERY 6 HOURS PRN
Qty: 18 G | Refills: 5 | Status: SHIPPED | OUTPATIENT
Start: 2024-03-28

## 2024-03-28 ASSESSMENT — PATIENT HEALTH QUESTIONNAIRE - PHQ9
2. FEELING DOWN, DEPRESSED OR HOPELESS: NOT AT ALL
SUM OF ALL RESPONSES TO PHQ QUESTIONS 1-9: 0
SUM OF ALL RESPONSES TO PHQ QUESTIONS 1-9: 0
SUM OF ALL RESPONSES TO PHQ9 QUESTIONS 1 & 2: 0
SUM OF ALL RESPONSES TO PHQ QUESTIONS 1-9: 0
SUM OF ALL RESPONSES TO PHQ QUESTIONS 1-9: 0
1. LITTLE INTEREST OR PLEASURE IN DOING THINGS: NOT AT ALL

## 2024-03-29 LAB
EST. AVERAGE GLUCOSE BLD GHB EST-MCNC: 117 MG/DL
HBA1C MFR BLD: 5.7 % (ref 4.8–5.6)

## 2025-01-31 ENCOUNTER — TELEPHONE (OUTPATIENT)
Dept: FAMILY MEDICINE CLINIC | Facility: CLINIC | Age: 65
End: 2025-01-31

## 2025-01-31 NOTE — TELEPHONE ENCOUNTER
Patient called requesting a refill for this medication.       albuterol sulfate HFA (PROVENTIL;VENTOLIN;PROAIR) 108 (90 Base) MCG/ACT inhaler    Potassium chloride (KLOR-CON M) 20 MEQ extended release tablet      indapamide (LOZOL) 2.5 MG table         [facility]/pharmacy #[id number] - [place], [place] - [address] -  [phone] -  [phone]

## 2025-02-03 DIAGNOSIS — E87.6 HYPOKALEMIA: ICD-10-CM

## 2025-02-03 DIAGNOSIS — J45.21 MILD INTERMITTENT REACTIVE AIRWAY DISEASE WITH ACUTE EXACERBATION: ICD-10-CM

## 2025-02-03 DIAGNOSIS — I10 PRIMARY HYPERTENSION: ICD-10-CM

## 2025-02-03 RX ORDER — POTASSIUM CHLORIDE 1500 MG/1
20 TABLET, EXTENDED RELEASE ORAL DAILY
Qty: 60 TABLET | Refills: 0 | Status: SHIPPED | OUTPATIENT
Start: 2025-02-03

## 2025-02-03 RX ORDER — ALBUTEROL SULFATE 90 UG/1
2 INHALANT RESPIRATORY (INHALATION) EVERY 6 HOURS PRN
Qty: 18 G | Refills: 0 | Status: SHIPPED | OUTPATIENT
Start: 2025-02-03

## 2025-02-03 RX ORDER — INDAPAMIDE 2.5 MG/1
2.5 TABLET ORAL DAILY
Qty: 90 TABLET | Refills: 0 | Status: SHIPPED | OUTPATIENT
Start: 2025-02-03 | End: 2025-05-04

## 2025-03-17 ENCOUNTER — TELEPHONE (OUTPATIENT)
Dept: FAMILY MEDICINE CLINIC | Facility: CLINIC | Age: 65
End: 2025-03-17

## 2025-03-17 NOTE — TELEPHONE ENCOUNTER
Patient called to request a letter stating that they have been treated by Dr. Sun for their asthma since earliest date available and has been treated by medications, listing which medications, as well as what limits her due to the diagnosis.     Patient would like to pick this up as soon as possible so please notify via MyChart or phone.

## 2025-04-08 ENCOUNTER — OFFICE VISIT (OUTPATIENT)
Dept: FAMILY MEDICINE CLINIC | Facility: CLINIC | Age: 65
End: 2025-04-08
Payer: COMMERCIAL

## 2025-04-08 VITALS
HEIGHT: 60 IN | TEMPERATURE: 98 F | SYSTOLIC BLOOD PRESSURE: 122 MMHG | BODY MASS INDEX: 32.98 KG/M2 | WEIGHT: 168 LBS | RESPIRATION RATE: 18 BRPM | OXYGEN SATURATION: 98 % | HEART RATE: 73 BPM | DIASTOLIC BLOOD PRESSURE: 82 MMHG

## 2025-04-08 DIAGNOSIS — R73.09 ABNORMAL GLUCOSE: Primary | ICD-10-CM

## 2025-04-08 DIAGNOSIS — J45.21 MILD INTERMITTENT REACTIVE AIRWAY DISEASE WITH ACUTE EXACERBATION: ICD-10-CM

## 2025-04-08 DIAGNOSIS — E78.49 OTHER HYPERLIPIDEMIA: ICD-10-CM

## 2025-04-08 DIAGNOSIS — E87.6 HYPOKALEMIA: ICD-10-CM

## 2025-04-08 DIAGNOSIS — I10 PRIMARY HYPERTENSION: ICD-10-CM

## 2025-04-08 LAB
ALBUMIN SERPL-MCNC: 4.1 G/DL (ref 3.2–4.6)
ALBUMIN/GLOB SERPL: 1 (ref 1–1.9)
ALP SERPL-CCNC: 69 U/L (ref 35–104)
ALT SERPL-CCNC: 16 U/L (ref 8–45)
ANION GAP SERPL CALC-SCNC: 11 MMOL/L (ref 7–16)
AST SERPL-CCNC: 23 U/L (ref 15–37)
BASOPHILS # BLD: 0.07 K/UL (ref 0–0.2)
BASOPHILS NFR BLD: 0.8 % (ref 0–2)
BILIRUB SERPL-MCNC: 0.5 MG/DL (ref 0–1.2)
BUN SERPL-MCNC: 11 MG/DL (ref 8–23)
CALCIUM SERPL-MCNC: 9.9 MG/DL (ref 8.8–10.2)
CHLORIDE SERPL-SCNC: 100 MMOL/L (ref 98–107)
CHOLEST SERPL-MCNC: 204 MG/DL (ref 0–200)
CO2 SERPL-SCNC: 28 MMOL/L (ref 20–29)
CREAT SERPL-MCNC: 0.63 MG/DL (ref 0.6–1.1)
DIFFERENTIAL METHOD BLD: NORMAL
EOSINOPHIL # BLD: 0.25 K/UL (ref 0–0.8)
EOSINOPHIL NFR BLD: 2.7 % (ref 0.5–7.8)
ERYTHROCYTE [DISTWIDTH] IN BLOOD BY AUTOMATED COUNT: 14.6 % (ref 11.9–14.6)
EST. AVERAGE GLUCOSE BLD GHB EST-MCNC: 122 MG/DL
GLOBULIN SER CALC-MCNC: 3.9 G/DL (ref 2.3–3.5)
GLUCOSE SERPL-MCNC: 95 MG/DL (ref 70–99)
HBA1C MFR BLD: 5.9 % (ref 0–5.6)
HCT VFR BLD AUTO: 38.4 % (ref 35.8–46.3)
HDLC SERPL-MCNC: 40 MG/DL (ref 40–60)
HDLC SERPL: 5.1 (ref 0–5)
HGB BLD-MCNC: 12.6 G/DL (ref 11.7–15.4)
IMM GRANULOCYTES # BLD AUTO: 0.02 K/UL (ref 0–0.5)
IMM GRANULOCYTES NFR BLD AUTO: 0.2 % (ref 0–5)
LDLC SERPL CALC-MCNC: 148 MG/DL (ref 0–100)
LYMPHOCYTES # BLD: 3.27 K/UL (ref 0.5–4.6)
LYMPHOCYTES NFR BLD: 35.5 % (ref 13–44)
MCH RBC QN AUTO: 28.9 PG (ref 26.1–32.9)
MCHC RBC AUTO-ENTMCNC: 32.8 G/DL (ref 31.4–35)
MCV RBC AUTO: 88.1 FL (ref 82–102)
MONOCYTES # BLD: 0.5 K/UL (ref 0.1–1.3)
MONOCYTES NFR BLD: 5.4 % (ref 4–12)
NEUTS SEG # BLD: 5.09 K/UL (ref 1.7–8.2)
NEUTS SEG NFR BLD: 55.4 % (ref 43–78)
NRBC # BLD: 0 K/UL (ref 0–0.2)
PLATELET # BLD AUTO: 317 K/UL (ref 150–450)
PMV BLD AUTO: 10.7 FL (ref 9.4–12.3)
POTASSIUM SERPL-SCNC: 3.7 MMOL/L (ref 3.5–5.1)
PROT SERPL-MCNC: 8 G/DL (ref 6.3–8.2)
RBC # BLD AUTO: 4.36 M/UL (ref 4.05–5.2)
SODIUM SERPL-SCNC: 140 MMOL/L (ref 136–145)
TRIGL SERPL-MCNC: 78 MG/DL (ref 0–150)
VLDLC SERPL CALC-MCNC: 16 MG/DL (ref 6–23)
WBC # BLD AUTO: 9.2 K/UL (ref 4.3–11.1)

## 2025-04-08 PROCEDURE — 99214 OFFICE O/P EST MOD 30 MIN: CPT | Performed by: FAMILY MEDICINE

## 2025-04-08 PROCEDURE — 3074F SYST BP LT 130 MM HG: CPT | Performed by: FAMILY MEDICINE

## 2025-04-08 PROCEDURE — 3079F DIAST BP 80-89 MM HG: CPT | Performed by: FAMILY MEDICINE

## 2025-04-08 RX ORDER — INDAPAMIDE 2.5 MG/1
2.5 TABLET ORAL DAILY
Qty: 90 TABLET | Refills: 0 | Status: SHIPPED | OUTPATIENT
Start: 2025-04-08 | End: 2025-07-07

## 2025-04-08 RX ORDER — POTASSIUM CHLORIDE 1500 MG/1
20 TABLET, EXTENDED RELEASE ORAL DAILY
Qty: 60 TABLET | Refills: 2 | Status: SHIPPED | OUTPATIENT
Start: 2025-04-08

## 2025-04-08 RX ORDER — ALBUTEROL SULFATE 90 UG/1
2 INHALANT RESPIRATORY (INHALATION) EVERY 6 HOURS PRN
Qty: 18 G | Refills: 0 | Status: SHIPPED | OUTPATIENT
Start: 2025-04-08

## 2025-04-08 ASSESSMENT — PATIENT HEALTH QUESTIONNAIRE - PHQ9
SUM OF ALL RESPONSES TO PHQ QUESTIONS 1-9: 0
2. FEELING DOWN, DEPRESSED OR HOPELESS: NOT AT ALL
SUM OF ALL RESPONSES TO PHQ QUESTIONS 1-9: 0
1. LITTLE INTEREST OR PLEASURE IN DOING THINGS: NOT AT ALL

## 2025-04-09 ENCOUNTER — RESULTS FOLLOW-UP (OUTPATIENT)
Dept: FAMILY MEDICINE CLINIC | Facility: CLINIC | Age: 65
End: 2025-04-09

## 2025-04-11 ASSESSMENT — ENCOUNTER SYMPTOMS
COLOR CHANGE: 0
SORE THROAT: 0
DIFFICULTY BREATHING: 0
NAUSEA: 0
ABDOMINAL DISTENTION: 0
ABDOMINAL PAIN: 0
EYE PAIN: 0
BLOOD IN STOOL: 0
BLURRED VISION: 0
SHORTNESS OF BREATH: 0
COUGH: 0
CHEST TIGHTNESS: 0
HEMOPTYSIS: 0
PHOTOPHOBIA: 0

## 2025-04-12 NOTE — PROGRESS NOTES
Fernanda Francois  1960 is a 64 y.o. female ,Established patient, here for evaluation of the following chief complaint(s):   Chief Complaint   Patient presents with    Follow-up     Chronic care follow up. Fasting.     Other     Needs letter for work regarding her Asthma.           1. Abnormal glucose  -     Hemoglobin A1C; Future  2. Mild intermittent reactive airway disease with acute exacerbation--NOTE THAT PATIENT DOES HAVE ASTHMA THAT IS BEING TREATED AND IS MADE WORSE BY DUST , COLD TEMPERATURES, ALLERGIES OR STRESS.  -     albuterol sulfate HFA (PROVENTIL;VENTOLIN;PROAIR) 108 (90 Base) MCG/ACT inhaler; Inhale 2 puffs into the lungs every 6 hours as needed for Wheezing, Disp-18 g, R-0Normal  3. Primary hypertension  -     indapamide (LOZOL) 2.5 MG tablet; Take 1 tablet by mouth daily, Disp-90 tablet, R-0Normal  -     CBC with Auto Differential; Future  -     Comprehensive Metabolic Panel; Future  4. Hypokalemia  -     potassium chloride (KLOR-CON M) 20 MEQ extended release tablet; Take 1 tablet by mouth daily, Disp-60 tablet, R-2Normal  5. Other hyperlipidemia  -     Lipid Panel; Future        Return in about 3 months (around 7/8/2025).        Subjective   SUBJECTIVE/OBJECTIVE:  Hypertension  This is a chronic problem. The current episode started more than 1 year ago. The problem has been gradually improving since onset. The problem is controlled. Pertinent negatives include no anxiety, blurred vision, chest pain, headaches, palpitations, peripheral edema, PND or shortness of breath.   Asthma  There is no chest tightness, cough, difficulty breathing, hemoptysis or shortness of breath. The current episode started more than 1 year ago. The problem occurs every several days. The problem has been unchanged. Pertinent negatives include no chest pain, ear pain, fever, headaches, myalgias, PND or sore throat. Her past medical history is significant for asthma.       Review of Systems   Constitutional:  Negative

## 2025-07-08 ENCOUNTER — OFFICE VISIT (OUTPATIENT)
Dept: FAMILY MEDICINE CLINIC | Facility: CLINIC | Age: 65
End: 2025-07-08
Payer: COMMERCIAL

## 2025-07-08 VITALS
HEART RATE: 81 BPM | OXYGEN SATURATION: 98 % | HEIGHT: 60 IN | BODY MASS INDEX: 33.77 KG/M2 | TEMPERATURE: 98 F | WEIGHT: 172 LBS | SYSTOLIC BLOOD PRESSURE: 110 MMHG | DIASTOLIC BLOOD PRESSURE: 66 MMHG

## 2025-07-08 DIAGNOSIS — M79.641 RIGHT HAND PAIN: Primary | ICD-10-CM

## 2025-07-08 DIAGNOSIS — I10 PRIMARY HYPERTENSION: ICD-10-CM

## 2025-07-08 DIAGNOSIS — E87.6 HYPOKALEMIA: ICD-10-CM

## 2025-07-08 DIAGNOSIS — J45.21 MILD INTERMITTENT REACTIVE AIRWAY DISEASE WITH ACUTE EXACERBATION: ICD-10-CM

## 2025-07-08 DIAGNOSIS — R60.0 LOCALIZED EDEMA: ICD-10-CM

## 2025-07-08 DIAGNOSIS — R73.03 PREDIABETES: ICD-10-CM

## 2025-07-08 PROCEDURE — 3078F DIAST BP <80 MM HG: CPT | Performed by: FAMILY MEDICINE

## 2025-07-08 PROCEDURE — 3074F SYST BP LT 130 MM HG: CPT | Performed by: FAMILY MEDICINE

## 2025-07-08 PROCEDURE — 99214 OFFICE O/P EST MOD 30 MIN: CPT | Performed by: FAMILY MEDICINE

## 2025-07-08 RX ORDER — INDAPAMIDE 2.5 MG/1
2.5 TABLET ORAL DAILY
Qty: 90 TABLET | Refills: 0 | Status: SHIPPED | OUTPATIENT
Start: 2025-07-08 | End: 2025-10-06

## 2025-07-08 RX ORDER — POTASSIUM CHLORIDE 1500 MG/1
20 TABLET, EXTENDED RELEASE ORAL DAILY
Qty: 60 TABLET | Refills: 2 | Status: SHIPPED | OUTPATIENT
Start: 2025-07-08

## 2025-07-08 RX ORDER — ALBUTEROL SULFATE 90 UG/1
2 INHALANT RESPIRATORY (INHALATION) EVERY 6 HOURS PRN
Qty: 18 G | Refills: 0 | Status: SHIPPED | OUTPATIENT
Start: 2025-07-08

## 2025-07-08 SDOH — ECONOMIC STABILITY: FOOD INSECURITY: WITHIN THE PAST 12 MONTHS, YOU WORRIED THAT YOUR FOOD WOULD RUN OUT BEFORE YOU GOT MONEY TO BUY MORE.: PATIENT DECLINED

## 2025-07-08 SDOH — ECONOMIC STABILITY: FOOD INSECURITY: WITHIN THE PAST 12 MONTHS, THE FOOD YOU BOUGHT JUST DIDN'T LAST AND YOU DIDN'T HAVE MONEY TO GET MORE.: SOMETIMES TRUE

## 2025-07-08 NOTE — PATIENT INSTRUCTIONS
[place] Food Resources*  (Call [facility] if you need more resources.)    Meals on Wheels  They offer: Meal delivery for eligible individuals.  Contact: [phone]    [employer] Share  They offer: Fresh food boxes. $5 for SNAP/EBT persons, $15 for all others.  Contact: [email or web address] (must preorder from site).   Helpful Info: Pickup every other Wednesday 11am-6pm at [address] [facility].[address] Food Pantry  They offer: Groceries/food.  Contact: [phone]; [address] [place] [address]  Helpful Info: Open 8am-5pm Monday-Thursdays, 8am-12pm Fridays.    [employer] [facility] Pant[employer]  They offer: Groceries/food.  Contact: [phone] - [address] [place] [address]  Helpful Info: Must call for hours and availability.     T[employer]oretta [employer]   They offer: Clothes/ food.   Contact: [phone] - [address]  [place] [address]  Helpful Info: Food bags/ hygiene kits Each Wednesday 7am- 11pm.  Hot Meals every Saturday at 12 noon and every Sunday after [employer]od[employer].  Hot Breakfast every Monday 7am- 8am.    [facility] Food Pantry  They offer: Groceries/food.  Contact: [phone]  Helpful Info: Call for hours and availability.     [facility] Food [facility]  They offer: Emergency food.  Contact: [phone]; [address] [place] [address]  Helpful Info: Hours are Monday, Wednesday, and Friday 9am-1pm  [email or web address]  - enter your zip code on the website to find the closest food bank near you    [employer] Food Pantry  They offer: Groceries/food.  Contact: [phone]; [address] [facility]; [facility] [address]  Helpful Info: Call for hours and availability.     [facility] Food Pantry  They offer: Groceries/food.  Contact: [phone]; [address] [place] [address]  Helpful Info: Call for hours and availability.

## 2025-07-09 ASSESSMENT — ENCOUNTER SYMPTOMS
BLURRED VISION: 0
NAUSEA: 0
SORE THROAT: 0
EYE PAIN: 0
COLOR CHANGE: 0
BLOOD IN STOOL: 0
PHOTOPHOBIA: 0
SHORTNESS OF BREATH: 0
COUGH: 0
ABDOMINAL DISTENTION: 0
ABDOMINAL PAIN: 0

## 2025-07-09 NOTE — PROGRESS NOTES
Fernanda Francois  1960 is a 64 y.o. female ,Established patient, here for evaluation of the following chief complaint(s):   Chief Complaint   Patient presents with    3 Month Follow-Up     Right index finger starting at the base has been having pain in it does believe its arthritis but just wanted to make sure--also has been having to be on her feet a lot and they have been swelling-- when she is able to sit and prop up her feet the swelling does go down some---    Medication Refill          1. Right hand pain  -     XR HAND RIGHT (MIN 3 VIEWS); Future  2. Mild intermittent reactive airway disease with acute exacerbation  -     albuterol sulfate HFA (PROVENTIL;VENTOLIN;PROAIR) 108 (90 Base) MCG/ACT inhaler; Inhale 2 puffs into the lungs every 6 hours as needed for Wheezing, Disp-18 g, R-0Normal  3. Primary hypertension  -     indapamide (LOZOL) 2.5 MG tablet; Take 1 tablet by mouth daily, Disp-90 tablet, R-0Normal  -     CBC with Auto Differential; Future  -     Comprehensive Metabolic Panel; Future  4. Hypokalemia  -     potassium chloride (KLOR-CON M) 20 MEQ extended release tablet; Take 1 tablet by mouth daily, Disp-60 tablet, R-2Normal  5. Prediabetes  -     Hemoglobin A1C; Future  6. Localized edema--for worse edema , take extra lozol 2 times week  -     TSH; Future        No follow-ups on file.        Subjective   SUBJECTIVE/OBJECTIVE:  Medication Refill  This is a chronic problem. The current episode started more than 1 year ago. The problem occurs daily. The problem has been unchanged. Pertinent negatives include no abdominal pain, chest pain, chills, coughing, fever, headaches, joint swelling, myalgias, nausea, rash, sore throat or weakness.   Hand Pain   Incident onset: none. There was no injury mechanism. Pain location: BASE OF INDEX FINGER; SOME SWELLING. Pertinent negatives include no chest pain.   Hypertension  This is a chronic problem. The current episode started more than 1 year ago. The

## 2025-07-10 ENCOUNTER — LAB (OUTPATIENT)
Dept: FAMILY MEDICINE CLINIC | Facility: CLINIC | Age: 65
End: 2025-07-10

## 2025-07-10 DIAGNOSIS — I10 PRIMARY HYPERTENSION: ICD-10-CM

## 2025-07-10 DIAGNOSIS — R73.03 PREDIABETES: ICD-10-CM

## 2025-07-10 DIAGNOSIS — R60.0 LOCALIZED EDEMA: ICD-10-CM

## 2025-07-10 LAB
ALBUMIN SERPL-MCNC: 3.7 G/DL (ref 3.2–4.6)
ALBUMIN/GLOB SERPL: 1 (ref 1–1.9)
ALP SERPL-CCNC: 67 U/L (ref 35–104)
ALT SERPL-CCNC: 16 U/L (ref 8–45)
ANION GAP SERPL CALC-SCNC: 12 MMOL/L (ref 7–16)
AST SERPL-CCNC: 19 U/L (ref 15–37)
BASOPHILS # BLD: 0.05 K/UL (ref 0–0.2)
BASOPHILS NFR BLD: 0.5 % (ref 0–2)
BILIRUB SERPL-MCNC: 0.4 MG/DL (ref 0–1.2)
BUN SERPL-MCNC: 15 MG/DL (ref 8–23)
CALCIUM SERPL-MCNC: 9.9 MG/DL (ref 8.8–10.2)
CHLORIDE SERPL-SCNC: 101 MMOL/L (ref 98–107)
CO2 SERPL-SCNC: 27 MMOL/L (ref 20–29)
CREAT SERPL-MCNC: 0.64 MG/DL (ref 0.6–1.1)
DIFFERENTIAL METHOD BLD: ABNORMAL
EOSINOPHIL # BLD: 0.24 K/UL (ref 0–0.8)
EOSINOPHIL NFR BLD: 2.6 % (ref 0.5–7.8)
ERYTHROCYTE [DISTWIDTH] IN BLOOD BY AUTOMATED COUNT: 14.8 % (ref 11.9–14.6)
EST. AVERAGE GLUCOSE BLD GHB EST-MCNC: 128 MG/DL
GLOBULIN SER CALC-MCNC: 3.6 G/DL (ref 2.3–3.5)
GLUCOSE SERPL-MCNC: 95 MG/DL (ref 70–99)
HBA1C MFR BLD: 6.1 % (ref 0–5.6)
HCT VFR BLD AUTO: 36.5 % (ref 35.8–46.3)
HGB BLD-MCNC: 12.3 G/DL (ref 11.7–15.4)
IMM GRANULOCYTES # BLD AUTO: 0.02 K/UL (ref 0–0.5)
IMM GRANULOCYTES NFR BLD AUTO: 0.2 % (ref 0–5)
LYMPHOCYTES # BLD: 3.43 K/UL (ref 0.5–4.6)
LYMPHOCYTES NFR BLD: 36.8 % (ref 13–44)
MCH RBC QN AUTO: 29.1 PG (ref 26.1–32.9)
MCHC RBC AUTO-ENTMCNC: 33.7 G/DL (ref 31.4–35)
MCV RBC AUTO: 86.3 FL (ref 82–102)
MONOCYTES # BLD: 0.53 K/UL (ref 0.1–1.3)
MONOCYTES NFR BLD: 5.7 % (ref 4–12)
NEUTS SEG # BLD: 5.06 K/UL (ref 1.7–8.2)
NEUTS SEG NFR BLD: 54.2 % (ref 43–78)
NRBC # BLD: 0 K/UL (ref 0–0.2)
PLATELET # BLD AUTO: 296 K/UL (ref 150–450)
PMV BLD AUTO: 10.5 FL (ref 9.4–12.3)
POTASSIUM SERPL-SCNC: 4 MMOL/L (ref 3.5–5.1)
PROT SERPL-MCNC: 7.3 G/DL (ref 6.3–8.2)
RBC # BLD AUTO: 4.23 M/UL (ref 4.05–5.2)
SODIUM SERPL-SCNC: 139 MMOL/L (ref 136–145)
TSH, 3RD GENERATION: 1.29 UIU/ML (ref 0.27–4.2)
WBC # BLD AUTO: 9.3 K/UL (ref 4.3–11.1)

## 2025-07-21 ENCOUNTER — COMMUNITY OUTREACH (OUTPATIENT)
Dept: FAMILY MEDICINE CLINIC | Facility: CLINIC | Age: 65
End: 2025-07-21

## 2025-07-23 ENCOUNTER — OFFICE VISIT (OUTPATIENT)
Dept: FAMILY MEDICINE CLINIC | Facility: CLINIC | Age: 65
End: 2025-07-23
Payer: COMMERCIAL

## 2025-07-23 VITALS
WEIGHT: 173.4 LBS | TEMPERATURE: 97.9 F | DIASTOLIC BLOOD PRESSURE: 72 MMHG | HEART RATE: 77 BPM | SYSTOLIC BLOOD PRESSURE: 114 MMHG | HEIGHT: 60 IN | RESPIRATION RATE: 16 BRPM | BODY MASS INDEX: 34.04 KG/M2 | OXYGEN SATURATION: 98 %

## 2025-07-23 DIAGNOSIS — M79.641 PAIN OF RIGHT HAND: ICD-10-CM

## 2025-07-23 DIAGNOSIS — I10 HYPERTENSION, UNSPECIFIED TYPE: Primary | ICD-10-CM

## 2025-07-23 PROCEDURE — 99213 OFFICE O/P EST LOW 20 MIN: CPT | Performed by: FAMILY MEDICINE

## 2025-07-23 PROCEDURE — 3078F DIAST BP <80 MM HG: CPT | Performed by: FAMILY MEDICINE

## 2025-07-23 PROCEDURE — 3074F SYST BP LT 130 MM HG: CPT | Performed by: FAMILY MEDICINE

## 2025-07-26 ASSESSMENT — ENCOUNTER SYMPTOMS
NAUSEA: 0
EYE PAIN: 0
SORE THROAT: 0
ABDOMINAL PAIN: 0
COLOR CHANGE: 0
SHORTNESS OF BREATH: 0
BLOOD IN STOOL: 0
ABDOMINAL DISTENTION: 0
PHOTOPHOBIA: 0
COUGH: 0

## 2025-07-26 NOTE — PROGRESS NOTES
Fernanda Francois  1960 is a 64 y.o. female ,Established patient, here for evaluation of the following chief complaint(s):   Chief Complaint   Patient presents with    Follow-up     F/u- right hand pain  Has not had the chance to get xrays yet          1. Hypertension, unspecified type--chronic stable continue meds    2. Pain of right hand--acute; much improved; no further evaluation        No follow-ups on file.        Subjective   SUBJECTIVE/OBJECTIVE:  Hand Pain   The incident occurred more than 1 week ago. Quality: hand pain improved. The pain is at a severity of 0/10. Pertinent negatives include no chest pain.   Hypertension  This is a chronic problem. The current episode started more than 1 year ago. The problem has been gradually improving since onset. Pertinent negatives include no chest pain, headaches, palpitations or shortness of breath.       Review of Systems   Constitutional:  Negative for chills and fever.   HENT:  Negative for ear pain, hearing loss and sore throat.    Eyes:  Negative for photophobia and pain.   Respiratory:  Negative for cough and shortness of breath.    Cardiovascular:  Negative for chest pain, palpitations and leg swelling.   Gastrointestinal:  Negative for abdominal distention, abdominal pain, blood in stool and nausea.   Genitourinary:  Negative for dysuria and urgency.   Musculoskeletal:  Negative for joint swelling and myalgias.   Skin:  Negative for color change, pallor and rash.   Neurological:  Negative for speech difficulty, weakness, light-headedness and headaches.   Hematological:  Negative for adenopathy.   Psychiatric/Behavioral:  Negative for agitation, confusion, hallucinations, self-injury and suicidal ideas.           Objective   Physical Exam  Constitutional:       Appearance: Normal appearance.   HENT:      Head: Normocephalic and atraumatic.      Nose: Nose normal.   Eyes:      Extraocular Movements: Extraocular movements intact.      Conjunctiva/sclera: